# Patient Record
Sex: FEMALE | Race: WHITE | Employment: FULL TIME | URBAN - METROPOLITAN AREA
[De-identification: names, ages, dates, MRNs, and addresses within clinical notes are randomized per-mention and may not be internally consistent; named-entity substitution may affect disease eponyms.]

---

## 2018-12-16 ENCOUNTER — HOSPITAL ENCOUNTER (EMERGENCY)
Age: 38
Discharge: HOME | End: 2018-12-16
Attending: EMERGENCY MEDICINE
Payer: COMMERCIAL

## 2018-12-16 VITALS
DIASTOLIC BLOOD PRESSURE: 69 MMHG | TEMPERATURE: 98.6 F | HEIGHT: 67 IN | OXYGEN SATURATION: 96 % | SYSTOLIC BLOOD PRESSURE: 113 MMHG | BODY MASS INDEX: 23.54 KG/M2 | HEART RATE: 97 BPM | RESPIRATION RATE: 14 BRPM | WEIGHT: 150 LBS

## 2018-12-16 DIAGNOSIS — M25.561 ACUTE PAIN OF RIGHT KNEE: Primary | ICD-10-CM

## 2018-12-16 PROCEDURE — 29505 APPLICATION LONG LEG SPLINT: CPT

## 2018-12-16 PROCEDURE — 99283 EMERGENCY DEPT VISIT LOW MDM: CPT

## 2018-12-16 PROCEDURE — 6370000000 HC RX 637 (ALT 250 FOR IP): Performed by: EMERGENCY MEDICINE

## 2018-12-16 RX ORDER — LEVONORGESTREL AND ETHINYL ESTRADIOL 0.1-0.02MG
1 KIT ORAL
COMMUNITY
Start: 2017-03-28 | End: 2021-03-25 | Stop reason: SDUPTHER

## 2018-12-16 RX ORDER — HYDROCODONE BITARTRATE AND ACETAMINOPHEN 5; 325 MG/1; MG/1
2 TABLET ORAL ONCE
Status: COMPLETED | OUTPATIENT
Start: 2018-12-16 | End: 2018-12-16

## 2018-12-16 RX ORDER — IBUPROFEN 600 MG/1
1 TABLET ORAL
COMMUNITY
Start: 2017-02-13

## 2018-12-16 RX ORDER — LEVOTHYROXINE SODIUM 0.07 MG/1
1 TABLET ORAL
COMMUNITY
Start: 2017-03-24

## 2018-12-16 RX ORDER — HYDROCODONE BITARTRATE AND ACETAMINOPHEN 5; 325 MG/1; MG/1
1 TABLET ORAL EVERY 6 HOURS PRN
Qty: 10 TABLET | Refills: 0 | Status: SHIPPED | OUTPATIENT
Start: 2018-12-16 | End: 2018-12-19

## 2018-12-16 RX ADMIN — HYDROCODONE BITARTRATE AND ACETAMINOPHEN 2 TABLET: 5; 325 TABLET ORAL at 10:37

## 2018-12-16 ASSESSMENT — PAIN DESCRIPTION - PAIN TYPE: TYPE: ACUTE PAIN

## 2018-12-16 ASSESSMENT — PAIN SCALES - GENERAL: PAINLEVEL_OUTOF10: 7

## 2018-12-16 ASSESSMENT — PAIN DESCRIPTION - ORIENTATION: ORIENTATION: RIGHT

## 2018-12-16 ASSESSMENT — PAIN DESCRIPTION - DESCRIPTORS: DESCRIPTORS: ACHING;THROBBING

## 2018-12-16 ASSESSMENT — PAIN DESCRIPTION - FREQUENCY: FREQUENCY: CONTINUOUS

## 2018-12-16 ASSESSMENT — PAIN DESCRIPTION - LOCATION: LOCATION: KNEE

## 2018-12-16 NOTE — ED PROVIDER NOTES
[facility]  [address] [address]  Phone: [phone]  Fax: [phone]    Pt Name: Corona Mackey  MRN: [id number]  Nicolegfurt 1980  Date of evaluation: 12/16/2018      CHIEF COMPLAINT       Chief Complaint   Patient presents with    Knee Pain     right inside          HISTORY OF PRESENT ILLNESS     Corona Mackey is a 45 y.o. female who presents With pain in the right knee over the past month which is worse over the past 2 days. Patient is a 7 out of a 10. She has swelling throughout the knee. No blunt trauma. No prior history of knee problems. She did see an orthopedic physician for this and x-rays were negative last week. She is due to have a MRI and follow up with the orthopedic physician again this week. She has no thigh or lower leg pain. No distal numbness. No fever or constitutional symptoms. Brought in by her relative. She took Motrin without relief of her pain. Her pain is a 7 out of a 10. REVIEW OF SYSTEMS       Other ROS negative except as noted above. PAST MEDICAL HISTORY    has a past medical history of Diabetes mellitus ([address] Rheumatoid arthritis ([facility] [address] and Thyroid disease. SURGICAL HISTORY      has no past surgical history on file. CURRENT MEDICATIONS       Previous Medications    GLUCAGON (GLUCAGON EMERGENCY) 1 MG INJECTION    as needed. IBUPROFEN (ADVIL;MOTRIN) 600 MG TABLET    Take 1 tablet by mouth    INSULIN LISPRO (HUMALOG) 100 UNIT/ML INJECTION VIAL    Insulin Pump    LEVONORGESTREL-ETHINYL ESTRADIOL (FALMINA) 0.1-20 MG-MCG PER TABLET    Take 1 tablet by mouth    LEVOTHYROXINE (SYNTHROID) 75 MCG TABLET    Take 1 tablet by mouth    METHOTREXATE 2.5 MG/ML SOLN    Take 10 tablets by mouth       ALLERGIES     has No Known Allergies. FAMILY HISTORY     has no family status information on file. family history is not on file. SOCIAL HISTORY      reports that she has never smoked.  She has never used smokeless tobacco. She reports that she does not drink alcohol or use drugs. PHYSICAL EXAM     INITIAL VITALS:  height is 5' 7\" (1.702 m) and weight is 68 kg (150 lb). Her oral temperature is 98.6 °F (37 °C). Her blood pressure is 113/69 and her pulse is 97. Her respiration is 14 and oxygen saturation is 96%. Constitutional: The patient is alert, well-developed, in no acute distress. Vital signs as noted. Right knee demonstrates some swelling throughout the knee joint with positive ballottement. Range of motion due to pain. No redness. No evidence of infection. There is no thigh or lower leg pain to palpation. Neurovascular compromise. DIFFERENTIAL DIAGNOSIS/ MEDICAL DECISION MAKING:      Knee immobilizer crutches and Norco to use as directed    Continue Motrin as directed     Follow Exit Care instructions closely. I have reviewed the disposition diagnosis with the patient and or their family/guardian. I have answered their questions and given discharge instructions. They voiced understanding of these instructions and did not have any further questions or complaints. DIAGNOSTIC RESULTS     RADIOLOGY:   Non-plain film images such as CT, Ultrasound and MRI are read by the radiologist. Plain radiographic images are visualized and preliminarily interpreted by the emergency physician with the below findings:  No orders to display         LABS:  No results found for this visit on 12/16/18. ABNORMAL LABS:  Labs Reviewed - No data to display           EMERGENCY DEPARTMENT COURSE:   Vitals:    Vitals:    12/16/18 1001   BP: 113/69   Pulse: 97   Resp: 14   Temp: 98.6 °F (37 °C)   TempSrc: Oral   SpO2: 96%   Weight: 68 kg (150 lb)   Height: 5' 7\" (1.702 m)     -------------------------  BP: 113/69, Temp: 98.6 °F (37 °C), Pulse: 97, Resp: 14    See DDX/MDM  (Differential Diagnosis/Medical Decision Making) above. FINAL IMPRESSION      1.  Acute pain of right knee          DISPOSITION/PLAN

## 2018-12-17 ENCOUNTER — HOSPITAL ENCOUNTER (OUTPATIENT)
Dept: MRI IMAGING | Facility: CLINIC | Age: 38
Discharge: HOME | End: 2018-12-19
Payer: COMMERCIAL

## 2018-12-17 DIAGNOSIS — R52 PAIN: ICD-10-CM

## 2018-12-17 PROCEDURE — 73721 MRI JNT OF LWR EXTRE W/O DYE: CPT

## 2019-10-25 ENCOUNTER — HOSPITAL ENCOUNTER (OUTPATIENT)
Age: 39
Setting detail: SPECIMEN
Discharge: HOME | End: 2019-10-25
Payer: COMMERCIAL

## 2019-10-25 ENCOUNTER — OFFICE VISIT (OUTPATIENT)
Dept: OBGYN CLINIC | Age: 39
End: 2019-10-25
Payer: COMMERCIAL

## 2019-10-25 VITALS
SYSTOLIC BLOOD PRESSURE: 131 MMHG | HEIGHT: 67 IN | HEART RATE: 80 BPM | DIASTOLIC BLOOD PRESSURE: 79 MMHG | WEIGHT: 159.8 LBS | BODY MASS INDEX: 25.08 KG/M2

## 2019-10-25 DIAGNOSIS — R35.0 URINARY FREQUENCY: ICD-10-CM

## 2019-10-25 DIAGNOSIS — N76.0 ACUTE VAGINITIS: Primary | ICD-10-CM

## 2019-10-25 DIAGNOSIS — N89.8 VAGINAL DISCHARGE: ICD-10-CM

## 2019-10-25 DIAGNOSIS — Z86.19 HISTORY OF HERPES GENITALIS: ICD-10-CM

## 2019-10-25 LAB
DIRECT EXAM: ABNORMAL
Lab: ABNORMAL
SPECIMEN DESCRIPTION: ABNORMAL

## 2019-10-25 PROCEDURE — 99202 OFFICE O/P NEW SF 15 MIN: CPT | Performed by: ADVANCED PRACTICE MIDWIFE

## 2019-10-25 RX ORDER — FLUCONAZOLE 150 MG/1
150 TABLET ORAL ONCE
Qty: 1 TABLET | Refills: 0 | Status: SHIPPED | OUTPATIENT
Start: 2019-10-25 | End: 2019-10-25

## 2019-10-25 RX ORDER — VALACYCLOVIR HYDROCHLORIDE 500 MG/1
500 TABLET, FILM COATED ORAL DAILY
Qty: 30 TABLET | Refills: 3 | Status: SHIPPED | OUTPATIENT
Start: 2019-10-25 | End: 2020-12-08 | Stop reason: SDUPTHER

## 2019-10-25 ASSESSMENT — ENCOUNTER SYMPTOMS
ABDOMINAL PAIN: 0
NAUSEA: 0
DIARRHEA: 0
SHORTNESS OF BREATH: 0
VOMITING: 0

## 2019-10-26 LAB
CULTURE: NO GROWTH
Lab: NORMAL
SPECIMEN DESCRIPTION: NORMAL

## 2019-10-29 DIAGNOSIS — B96.89 BV (BACTERIAL VAGINOSIS): Primary | ICD-10-CM

## 2019-10-29 DIAGNOSIS — N76.0 BV (BACTERIAL VAGINOSIS): Primary | ICD-10-CM

## 2019-10-29 RX ORDER — METRONIDAZOLE 500 MG/1
500 TABLET ORAL 2 TIMES DAILY
Qty: 14 TABLET | Refills: 0 | Status: SHIPPED | OUTPATIENT
Start: 2019-10-29 | End: 2019-11-05

## 2020-03-25 PROBLEM — Z86.19 HISTORY OF HERPES GENITALIS: Status: RESOLVED | Noted: 2019-10-25 | Resolved: 2020-03-24

## 2020-12-08 ENCOUNTER — HOSPITAL ENCOUNTER (OUTPATIENT)
Age: 40
Setting detail: SPECIMEN
Discharge: HOME | End: 2020-12-08
Payer: COMMERCIAL

## 2020-12-08 ENCOUNTER — OFFICE VISIT (OUTPATIENT)
Dept: OBGYN CLINIC | Age: 40
End: 2020-12-08
Payer: COMMERCIAL

## 2020-12-08 VITALS
HEART RATE: 78 BPM | BODY MASS INDEX: 24.25 KG/M2 | HEIGHT: 68 IN | WEIGHT: 160 LBS | SYSTOLIC BLOOD PRESSURE: 135 MMHG | DIASTOLIC BLOOD PRESSURE: 86 MMHG

## 2020-12-08 PROCEDURE — 99213 OFFICE O/P EST LOW 20 MIN: CPT | Performed by: ADVANCED PRACTICE MIDWIFE

## 2020-12-08 RX ORDER — VALACYCLOVIR HYDROCHLORIDE 500 MG/1
500 TABLET, FILM COATED ORAL 2 TIMES DAILY PRN
Qty: 30 TABLET | Refills: 3 | Status: SHIPPED | OUTPATIENT
Start: 2020-12-08 | End: 2021-06-01

## 2020-12-08 ASSESSMENT — ENCOUNTER SYMPTOMS
VOMITING: 0
SHORTNESS OF BREATH: 0
ABDOMINAL PAIN: 0
NAUSEA: 0
DIARRHEA: 0

## 2020-12-08 ASSESSMENT — PATIENT HEALTH QUESTIONNAIRE - PHQ9
SUM OF ALL RESPONSES TO PHQ QUESTIONS 1-9: 0
2. FEELING DOWN, DEPRESSED OR HOPELESS: 0
1. LITTLE INTEREST OR PLEASURE IN DOING THINGS: 0
SUM OF ALL RESPONSES TO PHQ QUESTIONS 1-9: 0
SUM OF ALL RESPONSES TO PHQ QUESTIONS 1-9: 0
SUM OF ALL RESPONSES TO PHQ9 QUESTIONS 1 & 2: 0

## 2020-12-08 NOTE — PROGRESS NOTES
Gastrointestinal: Negative for abdominal pain, diarrhea, nausea and vomiting. Genitourinary: Positive for dysuria and vaginal discharge (itching). Negative for difficulty urinating, dyspareunia, menstrual problem and vaginal pain. Neurological: Negative for dizziness, light-headedness and headaches. Objective  /86 (Site: Right Upper Arm, Position: Sitting, Cuff Size: Medium Adult)   Pulse 78   Ht 5' 8.4\" (1.737 m)   Wt 160 lb (72.6 kg)   BMI 24.04 kg/m²     Physical Exam  Vitals signs reviewed. Constitutional:       General: She is not in acute distress. Appearance: She is well-developed. She is not diaphoretic. Neck:      Musculoskeletal: Normal range of motion. Thyroid: No thyromegaly. Cardiovascular:      Rate and Rhythm: Normal rate and regular rhythm. Pulmonary:      Effort: Pulmonary effort is normal. No respiratory distress. Breath sounds: Normal breath sounds. Genitourinary:     Comments: Vulvar redness noted  Musculoskeletal: Normal range of motion. Skin:     General: Skin is warm and dry. Neurological:      Mental Status: She is alert and oriented to person, place, and time. Psychiatric:         Behavior: Behavior normal.         Thought Content: Thought content normal.         Judgment: Judgment normal.           Assessment & Plan  1. Vaginal itching  - To follow up with results   - VAGINITIS DNA PROBE  - Reviewed medicated OTC monistat wipes and vitamin e oil on the tissue. 2. Dysuria  - To follow up with urine culture. - Discussed increasing fluids and AZO over the counter  - Culture, Urine; Future    3. History of herpes genitalis  - valACYclovir (VALTREX) 500 MG tablet; Take 1 tablet by mouth 2 times daily as needed (during HSV outbreaks)  Dispense: 30 tablet; Refill: 3        Patient was seen with total face to face time of 15 minutes. Morethan 50% of this visit was on counseling and education regarding her diagnoses and her options.  She was also counseled on her preventative health maintenance recommendations and follow-up. Return for annual due for pap.     Electronically Signed ARNULFO Pennington CNM

## 2020-12-09 LAB
DIRECT EXAM: NORMAL
Lab: NORMAL
SPECIMEN DESCRIPTION: NORMAL

## 2020-12-10 LAB
CULTURE: NO GROWTH
Lab: NORMAL
SPECIMEN DESCRIPTION: NORMAL

## 2021-03-25 ENCOUNTER — HOSPITAL ENCOUNTER (OUTPATIENT)
Age: 41
Setting detail: SPECIMEN
Discharge: HOME | End: 2021-03-25
Payer: COMMERCIAL

## 2021-03-25 ENCOUNTER — OFFICE VISIT (OUTPATIENT)
Dept: OBGYN CLINIC | Age: 41
End: 2021-03-25
Payer: COMMERCIAL

## 2021-03-25 VITALS
BODY MASS INDEX: 25.76 KG/M2 | SYSTOLIC BLOOD PRESSURE: 132 MMHG | WEIGHT: 170 LBS | HEART RATE: 90 BPM | HEIGHT: 68 IN | DIASTOLIC BLOOD PRESSURE: 84 MMHG

## 2021-03-25 DIAGNOSIS — Z12.31 SCREENING MAMMOGRAM, ENCOUNTER FOR: ICD-10-CM

## 2021-03-25 DIAGNOSIS — N93.0 POSTCOITAL BLEEDING: ICD-10-CM

## 2021-03-25 DIAGNOSIS — N94.6 DYSMENORRHEA: ICD-10-CM

## 2021-03-25 DIAGNOSIS — Z01.419 WELL WOMAN EXAM: Primary | ICD-10-CM

## 2021-03-25 PROCEDURE — 99396 PREV VISIT EST AGE 40-64: CPT | Performed by: ADVANCED PRACTICE MIDWIFE

## 2021-03-25 RX ORDER — LEVONORGESTREL AND ETHINYL ESTRADIOL 0.1-0.02MG
1 KIT ORAL DAILY
Qty: 1 PACKET | Refills: 12 | Status: SHIPPED | OUTPATIENT
Start: 2021-03-25 | End: 2022-06-23

## 2021-03-25 SDOH — SOCIAL STABILITY: SOCIAL NETWORK
DO YOU BELONG TO ANY CLUBS OR ORGANIZATIONS SUCH AS CHURCH GROUPS UNIONS, FRATERNAL OR ATHLETIC GROUPS, OR SCHOOL GROUPS?: NOT ASKED

## 2021-03-25 SDOH — SOCIAL STABILITY: SOCIAL INSECURITY: WITHIN THE LAST YEAR, HAVE YOU BEEN HUMILIATED OR EMOTIONALLY ABUSED IN OTHER WAYS BY YOUR PARTNER OR EX-PARTNER?: NO

## 2021-03-25 SDOH — SOCIAL STABILITY: SOCIAL NETWORK: HOW OFTEN DO YOU GET TOGETHER WITH FRIENDS OR RELATIVES?: NOT ASKED

## 2021-03-25 SDOH — SOCIAL STABILITY: SOCIAL NETWORK: HOW OFTEN DO YOU ATTENT MEETINGS OF THE CLUB OR ORGANIZATION YOU BELONG TO?: NOT ASKED

## 2021-03-25 SDOH — ECONOMIC STABILITY: FOOD INSECURITY: WITHIN THE PAST 12 MONTHS, THE FOOD YOU BOUGHT JUST DIDN'T LAST AND YOU DIDN'T HAVE MONEY TO GET MORE.: NEVER TRUE

## 2021-03-25 SDOH — ECONOMIC STABILITY: TRANSPORTATION INSECURITY
IN THE PAST 12 MONTHS, HAS THE LACK OF TRANSPORTATION KEPT YOU FROM MEDICAL APPOINTMENTS OR FROM GETTING MEDICATIONS?: NO

## 2021-03-25 SDOH — SOCIAL STABILITY: SOCIAL INSECURITY: WITHIN THE LAST YEAR, HAVE YOU BEEN AFRAID OF YOUR PARTNER OR EX-PARTNER?: NO

## 2021-03-25 ASSESSMENT — ENCOUNTER SYMPTOMS
DIARRHEA: 0
NAUSEA: 0
SHORTNESS OF BREATH: 0
VOMITING: 0
ABDOMINAL PAIN: 0

## 2021-03-25 ASSESSMENT — PATIENT HEALTH QUESTIONNAIRE - PHQ9
SUM OF ALL RESPONSES TO PHQ QUESTIONS 1-9: 0

## 2021-03-25 ASSESSMENT — ANXIETY QUESTIONNAIRES
6. BECOMING EASILY ANNOYED OR IRRITABLE: 0-NOT AT ALL
7. FEELING AFRAID AS IF SOMETHING AWFUL MIGHT HAPPEN: 0-NOT AT ALL
GAD7 TOTAL SCORE: 0
5. BEING SO RESTLESS THAT IT IS HARD TO SIT STILL: 0-NOT AT ALL

## 2021-03-25 NOTE — PROGRESS NOTES
[place] [address] [address] [facility] [address]  Dept: [phone]    Patient Name: Chris Mendoza  Patient Age: 36 y.o. Date of Visit: 3/25/2021    Subjective  Chief Complaint   Patient presents with   [facility] Gynecologic Exam     prev. pap 11-2-17 satis/neg bleeding during sex      No LMP recorded. (Menstrual status: Other - See Notes). Chaperone for Intimate Exam   Chaperone was offered as part of the rooming process. Patient declined and agrees to continue with exam without a chaperone.  Chaperone: n/a    HPI  Patient arrives for annual exam.  Patient has concerns today. Concerns include postcoital bleeding that she has noticed for the past month. Reports it is painless and spontaneously resolves  Patient reports is  sexually active with 1 male partner(s). Patient denies  pain with sex. Patient admits to a history of sexually transmitted infection(s). Patient does want screening for sexually transmitted infection(s). Reports is  on contraception oral contraception due to dysmenorrhea. Patient has a history of a tubal ligation    PHQ Scores 3/25/2021 12/8/2020   PHQ2 Score 0 0   PHQ9 Score 0 0     Interpretation of Total Score Depression Severity: 1-4 = Minimal depression, 5-9 = Mild depression, 10-14 = Moderate depression, 15-19 = Moderately severe depression, 20-27 = Severe depression  IZZY 7 SCORE 3/25/2021   IZZY-7 Total Score 0     Interpretation of IZZY-7 score: 5-9 = mild anxiety, 10-14 = moderate anxiety, 15+ = severe anxiety. Recommend referral to behavioral health for scores 10 or greater. Relationships   Intimate partner violence    Fear of current or ex partner: No    Emotionally abused: No    Physically abused: No    Forced sexual activity: No       Review of Systems   Constitutional: Negative for unexpected weight change. Respiratory: Negative for shortness of breath.     Cardiovascular: Negative for chest pain, palpitations and leg swelling. Gastrointestinal: Negative for abdominal pain, diarrhea, nausea and vomiting. Genitourinary: Positive for vaginal bleeding. Negative for difficulty urinating, dyspareunia, menstrual problem, vaginal discharge and vaginal pain. Neurological: Negative for dizziness, light-headedness and headaches. Preventive Health Screening:   Date of last pap: ? No results found for: CYTORPT               History of Abnormal Paps: yes - hx of     History of Gestational Diabetes: No     If Yes, Glucose screening ordered:No    Preventive screening: Yes    Family history of Breast, Ovarian, Colon or Uterine Cancer:  Half sister has ovarian cancer     If Yes see scanned worksheet    Objective  /84 (Site: Right Upper Arm, Position: Sitting, Cuff Size: Medium Adult)   Pulse 90   Ht 5' 8.4\" (1.737 m)   Wt 170 lb (77.1 kg)   BMI 25.55 kg/m²       Gynecologic History  Menarche: 12  Monthly menses (days): regular   Length: 5  Flow: moderate    Menopause: no    Sexually active: Yes  New Sex Partner within 3 months: No  Domestic Violence Screening: negative    STD history: Yes     Birth control method :Yes birth control pill and history of tubal ligation      Physical Exam  Constitutional:       Appearance: Normal appearance. Genitourinary:      No vulval tenderness or Bartholin's cyst noted. No signs of labial injury. Cervical polyp present. No cervical motion tenderness or friability. Genitourinary Comments: Cervical polpy noted, very friable. Grasped with ring forceps which were then twisted with gentle traction polyp removed. Bleeding noted from cervix pressure placed with swab and silver nitrate used, hemostasis achieved. Patient tolerated well   Neck:      Musculoskeletal: Normal range of motion. Cardiovascular:      Rate and Rhythm: Normal rate and regular rhythm. Pulmonary:      Effort: Pulmonary effort is normal.      Breath sounds: Normal breath sounds.    Neurological: Mental Status: She is alert and oriented to person, place, and time. Psychiatric:         Mood and Affect: Mood normal.         Behavior: Behavior normal.         Thought Content: Thought content normal.         Judgment: Judgment normal.   Vitals signs reviewed. Assessment & Plan  1. Well woman exam  - PAP Smear; Future    Age 25 and > Well Woman Care  General Health:  [x] Alcohol screening & counseling  [x] Blood pressure screening: normal  [x] Contraceptive counseling & methods: hx of tubal ligation and using birth control pills  [x] Depression Screening: Negative  [] Diabetes Screening: known type 1 diabetic      [x] Folic acid supplementation  [x] Healthful diet & activity counseling:  discussed  [x] Interpersonal violence screening: Negative  [x] Lipid screening: reports within in the year but not available in epic for review  [x] Obesity Screening: Body mass index is 25.55 kg/m². [] Osteoporosis screening  [x] Substance use screening & counseling  [x] Tobacco screening & counseling  [x] Urinary incontinence screening    Infectious Diseases:  [x] Gonorrhea & chlamydia screening: discussed, obtained at visit  [x] Hepatitis C Screening ordered 3/25/2021   [x] HIV risk assessment/ testing (at least once in lifetime): discussed, ordered. [] Immunizations:   [x] STI prevention counseling    Cancer:  [x] Cervical cancer screening: discussed to follow up per asccp guidelines  [x] Mammograms (started at 39yrs old): discussed ordered and scheduled at visit  [x] BRCA testing risk assessment: discussed, declined    2. Screening mammogram, encounter for  - ELIZABETH DIGITAL SCREEN W OR WO CAD BILATERAL; Future    3. Dysmenorrhea  - Patient was educated to notify office and seek medical care if abdominal pain, chest pain, severe headaches, eye problems/loss of vision, or severe leg pain or swelling in the calf occurs (ACHES).   - Patient is appropriate to continue OCP care  - levonorgestrel-ethinyl estradiol ([address]) 0.1-20 MG-MCG per tablet; Take 1 tablet by mouth daily  Dispense: 1 packet; Refill: 12    4. Postcoital bleeding  - Hemostasis achieved after removal. To follow up with pathology. Reviewed bleeding precautions.   - T. pallidum Ab; Future  - HIV Screen; Future  - Hepatitis C Antibody; Future  - C.trachomatis N.gonorrhoeae DNA, Thin Prep; Future  - Vaginitis DNA Probe; Future  - Surgical Pathology      Return if symptoms worsen or fail to improve. The patient, Gina Ghotra , was seen with a total time spent of 25 minutes for the visit on this date of service by the [facility]  The time component, involved both face-to-face (counseling and education)  and non face-to-face time (care coordination), spent in determining the total time component. She was also counseled on her preventative health maintenance recommendations and follow-up.     Electronically Signed by Dioni Antonio

## 2021-03-26 ENCOUNTER — TELEPHONE (OUTPATIENT)
Dept: OBGYN CLINIC | Age: 41
End: 2021-03-26

## 2021-03-26 DIAGNOSIS — N93.0 POSTCOITAL BLEEDING: ICD-10-CM

## 2021-03-26 DIAGNOSIS — B96.89 BV (BACTERIAL VAGINOSIS): Primary | ICD-10-CM

## 2021-03-26 DIAGNOSIS — N76.0 BV (BACTERIAL VAGINOSIS): Primary | ICD-10-CM

## 2021-03-26 LAB
CHLAMYDIA BY THIN PREP: NEGATIVE
DIRECT EXAM: ABNORMAL
Lab: ABNORMAL
N. GONORRHOEAE DNA, THIN PREP: NEGATIVE
SPECIMEN DESCRIPTION: ABNORMAL
SPECIMEN DESCRIPTION: NORMAL

## 2021-03-26 RX ORDER — METRONIDAZOLE 500 MG/1
500 TABLET ORAL 2 TIMES DAILY
Qty: 14 TABLET | Refills: 0 | Status: SHIPPED | OUTPATIENT
Start: 2021-03-26 | End: 2021-04-02

## 2021-03-27 LAB
HPV SAMPLE: NORMAL
HPV, GENOTYPE 16: NOT DETECTED
HPV, GENOTYPE 18: NOT DETECTED
HPV, HIGH RISK OTHER: NOT DETECTED
HPV, INTERPRETATION: NORMAL
SPECIMEN DESCRIPTION: NORMAL

## 2021-03-29 LAB — SURGICAL PATHOLOGY REPORT: NORMAL

## 2021-03-30 ENCOUNTER — TELEPHONE (OUTPATIENT)
Dept: OBGYN CLINIC | Age: 41
End: 2021-03-30

## 2021-04-01 LAB — CYTOLOGY REPORT: NORMAL

## 2021-04-05 ENCOUNTER — TELEPHONE (OUTPATIENT)
Dept: OBGYN CLINIC | Age: 41
End: 2021-04-05

## 2021-04-15 ENCOUNTER — HOSPITAL ENCOUNTER (OUTPATIENT)
Age: 41
Setting detail: SPECIMEN
Discharge: HOME | End: 2021-04-15
Payer: COMMERCIAL

## 2021-04-15 ENCOUNTER — OFFICE VISIT (OUTPATIENT)
Dept: OBGYN CLINIC | Age: 41
End: 2021-04-15
Payer: COMMERCIAL

## 2021-04-15 VITALS
BODY MASS INDEX: 24.34 KG/M2 | DIASTOLIC BLOOD PRESSURE: 83 MMHG | HEART RATE: 80 BPM | HEIGHT: 70 IN | WEIGHT: 170 LBS | SYSTOLIC BLOOD PRESSURE: 133 MMHG | TEMPERATURE: 80 F

## 2021-04-15 DIAGNOSIS — R87.615 PAP SMEAR OF CERVIX UNSATISFACTORY: Primary | ICD-10-CM

## 2021-04-15 PROCEDURE — 99211 OFF/OP EST MAY X REQ PHY/QHP: CPT | Performed by: ADVANCED PRACTICE MIDWIFE

## 2021-04-20 LAB
CYTOLOGY REPORT: NORMAL
HPV SAMPLE: ABNORMAL
HPV, GENOTYPE 16: NOT DETECTED
HPV, GENOTYPE 18: NOT DETECTED
HPV, HIGH RISK OTHER: DETECTED
HPV, INTERPRETATION: ABNORMAL
SPECIMEN DESCRIPTION: ABNORMAL

## 2021-05-11 ENCOUNTER — TELEPHONE (OUTPATIENT)
Dept: OBGYN CLINIC | Age: 41
End: 2021-05-11

## 2021-05-18 ENCOUNTER — TELEPHONE (OUTPATIENT)
Dept: OBGYN CLINIC | Age: 41
End: 2021-05-18

## 2021-05-25 ENCOUNTER — TELEPHONE (OUTPATIENT)
Dept: OBGYN CLINIC | Age: 41
End: 2021-05-25

## 2021-05-25 PROBLEM — B97.7 HPV IN FEMALE: Status: ACTIVE | Noted: 2021-05-25

## 2021-05-25 NOTE — TELEPHONE ENCOUNTER
Patient aware of normal pap and that HPV was present so they recommended yearly Pap. Patient  understood.

## 2021-06-01 DIAGNOSIS — Z86.19 HISTORY OF HERPES GENITALIS: ICD-10-CM

## 2021-06-01 RX ORDER — VALACYCLOVIR HYDROCHLORIDE 500 MG/1
TABLET, FILM COATED ORAL
Qty: 30 TABLET | Refills: 3 | Status: SHIPPED | OUTPATIENT
Start: 2021-06-01 | End: 2021-09-28 | Stop reason: SDUPTHER

## 2021-09-02 ENCOUNTER — TELEPHONE (OUTPATIENT)
Dept: OBGYN CLINIC | Age: 41
End: 2021-09-02

## 2021-09-28 DIAGNOSIS — Z86.19 HISTORY OF HERPES GENITALIS: ICD-10-CM

## 2021-09-29 RX ORDER — VALACYCLOVIR HYDROCHLORIDE 500 MG/1
TABLET, FILM COATED ORAL
Qty: 30 TABLET | Refills: 3 | Status: SHIPPED | OUTPATIENT
Start: 2021-09-29 | End: 2022-04-19

## 2021-10-26 ENCOUNTER — HOSPITAL ENCOUNTER (OUTPATIENT)
Dept: ULTRASOUND IMAGING | Facility: CLINIC | Age: 41
Discharge: HOME | End: 2021-10-28
Payer: COMMERCIAL

## 2021-10-26 DIAGNOSIS — E04.2 NONTOXIC MULTINODULAR GOITER: ICD-10-CM

## 2021-10-26 DIAGNOSIS — R13.10 PROBLEMS WITH SWALLOWING AND MASTICATION: ICD-10-CM

## 2021-10-26 PROCEDURE — 76536 US EXAM OF HEAD AND NECK: CPT

## 2022-02-21 ENCOUNTER — TELEPHONE (OUTPATIENT)
Dept: OBGYN CLINIC | Age: 42
End: 2022-02-21

## 2022-04-14 DIAGNOSIS — Z86.19 HISTORY OF HERPES GENITALIS: ICD-10-CM

## 2022-04-14 DIAGNOSIS — N94.6 DYSMENORRHEA: ICD-10-CM

## 2022-04-14 RX ORDER — LEVONORGESTREL AND ETHINYL ESTRADIOL 0.1-0.02MG
KIT ORAL
Qty: 28 TABLET | OUTPATIENT
Start: 2022-04-14

## 2022-04-14 NOTE — TELEPHONE ENCOUNTER
Max Rivera is requesting a refill on valtrex.      Last Annual visit:  04/15/21  Next Office visit:  None on file    Currently Pregnant no  Last OB visit: n/a  Next OB visit: n/a    Last prescribing provider:  Renny quinn

## 2022-04-14 NOTE — TELEPHONE ENCOUNTER
Isaak Gutierrez is requesting a refill on lessina.  (not in patients chart)     Last Annual visit:  04/15/21  Next Office visit:  None on file    Currently Pregnant no  Last OB visit: n/a  Next OB visit: n/a    Last prescribing provider:  Bari quinn

## 2022-04-19 DIAGNOSIS — N94.6 DYSMENORRHEA: ICD-10-CM

## 2022-04-19 RX ORDER — LEVONORGESTREL AND ETHINYL ESTRADIOL 0.1-0.02MG
KIT ORAL
Qty: 28 TABLET | OUTPATIENT
Start: 2022-04-19

## 2022-04-19 RX ORDER — VALACYCLOVIR HYDROCHLORIDE 500 MG/1
TABLET, FILM COATED ORAL
Qty: 30 TABLET | Refills: 3 | Status: SHIPPED | OUTPATIENT
Start: 2022-04-19 | End: 2022-09-28

## 2022-04-19 NOTE — TELEPHONE ENCOUNTER
Kiet Johnson is requesting a refill on LESSINA-28 TABLET. Last Annual visit:  04/15/21  Next Office visit:  None on file (lmom to call and schedule)    Currently Pregnant no   Last OB visit: n/a  Next OB visit: n/a    Last prescribing provider:  Tarsha quinn     I was unable to find the Zarina on her med list..

## 2022-06-13 NOTE — PROGRESS NOTES
[address] [facility]  [address] [address]  Dept: [phone]    Patient Name: Max Rivera  Patient Age: 43 y.o. Date of Visit: 2022    Subjective  Chief Complaint   Patient presents with    Pelvic Pain     Right sided pelvic pain, history of ovarian cyst     No LMP recorded. (Menstrual status: Irregular periods). HPI    Max Rivera is a  who presents with concerns of right lower quadrant pain and irregular bleeding. Has a history of ovarian cyst, pain feels similar. Pain is in the RLQ, has been present on and off for about 3 months. Pain started in March. She describes the pain as cramping. The pain is a 5/10. It does not radiate anywhere. Nothing seems to make the pain better or worse. Has  tried  OTC medications, Motrin and Tylenol. Reports minimal/no improvement. Rosario does not have regular menstrual cycles since she uses continuous OCPs. Usually does not have breakthrough bleeding. Current bleeding, it started Thursay, flow varies by day. Had breakthrough bleeding 2 weeks ago for 2-3 days. Bright red blood. Reports is sexually active. Admits pain with sexual activity, not related to length or position only happens occasionally. Pain as not increased or improved over the last 3 months. Reports is active with 1 sex partner (male). Reports sex is consensual.   Reports is preventing a pregnancy by OTCs. Patient is happy with method. Patient does not want screening for STDs.        OB History    Para Term  AB Living   2 0 0 0 0 2   SAB IAB Ectopic Molar Multiple Live Births   0 0 0 0 0 0      # Outcome Date GA Lbr Mirza/2nd Weight Sex Delivery Anes PTL Lv   2       CS-LTranv      1       CS-LTranv        Past Medical History:   Diagnosis Date    Diabetes mellitus ([facility] [address]     Rheumatoid arthritis ([facility] [address]     Thyroid disease Past Surgical History:   Procedure Laterality Date    ANKLE SURGERY Left     CARPAL TUNNEL RELEASE Left      SECTION      x2    FINGER SURGERY Left     KNEE SURGERY      x2    SHOULDER SURGERY Left      Family History   Problem Relation Age of Onset    No Known Problems Father      Social History     Socioeconomic History    Marital status: Single     Spouse name: Not on file    Number of children: Not on file    Years of education: Not on file    Highest education level: Not on file   Occupational History    Not on file   Tobacco Use    Smoking status: Never Smoker    Smokeless tobacco: Never Used   Vaping Use    Vaping Use: Never used   Substance and Sexual Activity    Alcohol use: No    Drug use: No    Sexual activity: Yes     Partners: Male   Other Topics Concern    Not on file   Social History Narrative    Not on file     Social Determinants of Health     Financial Resource Strain: Low Risk     Difficulty of Paying Living Expenses: Not hard at all   Food Insecurity: No Food Insecurity    Worried About [address] in the Last Year: Never true    [facility] [employer] in the Last Year: Never true   Transportation Needs:     Lack of Transportation (Medical): Not on file    Lack of Transportation (Non-Medical):  Not on file   Physical Activity:     Days of Exercise per Week: Not on file    Minutes of Exercise per Session: Not on file   Stress:     Feeling of Stress : Not on file   Social Connections:     Frequency of Communication with Friends and Family: Not on file    Frequency of Social Gatherings with Friends and Family: Not on file    Attends [employer] Services: Not on file    Active Member of Clubs or Organizations: Not on file    Attends Club or Organization Meetings: Not on file    Marital Status: Not on file   Intimate Partner Violence:     Fear of Current or Ex-Partner: Not on file    Emotionally Abused: Not on file    Physically Abused: Not on file    Sexually Abused: Not on file   Housing Stability:     Unable to Pay for Housing in the Last Year: Not on file    Number of Places Lived in the Last Year: Not on file    Unstable Housing in the Last Year: Not on file         MEDICATIONS:  Current Outpatient Medications   Medication Sig Dispense Refill    VITAMIN D PO Take by mouth      Ascorbic Acid (VITAMIN C PO) Take by mouth      valACYclovir (VALTREX) 500 MG tablet TAKE 1 TABLET BY MOUTH TWICE DAILY AS NEEDED DURING HSV OUTBREAKS 30 tablet 3    levonorgestrel-ethinyl estradiol (FALMINA) 0.1-20 MG-MCG per tablet Take 1 tablet by mouth daily 1 packet 12    Adalimumab (HUMIRA SC) Inject into the skin      levothyroxine (SYNTHROID) 75 MCG tablet Take 1 tablet by mouth      ibuprofen (ADVIL;MOTRIN) 600 MG tablet Take 1 tablet by mouth      insulin lispro (HUMALOG) 100 UNIT/ML injection vial Insulin Pump      glucagon (GLUCAGON EMERGENCY) 1 MG injection as needed. No current facility-administered medications for this visit. ALLERGIES:  Allergies as of 06/14/2022    (No Known Allergies)       Review of Systems:  Constitutional: Negative for chills, fever, fatigue. Respiratory: Negative for shortness of breath and cough. Cardiovascular: Negative for chest pain and palpitations. Gastrointestinal: Negative for diarrhea, nausea, and vomiting. Positive for RLQ pain. Genitourinary: Negative for dysuria, frequency, and urgency. Musculoskeletal: Negative for back pain and joint swelling. Neurological: Negative for dizziness, weakness, and seizures. Psychiatric: Negative for dysphoric mood or sleep disturbances. Not anxious/nervous. Objective  /78   Ht 5' 7\" (1.702 m)   Wt 158 lb 12.8 oz (72 kg)   BMI 24.87 kg/m²     Physical Exam  Constitutional:       Appearance: Normal appearance. She is normal weight. HENT:      Head: Normocephalic and atraumatic.    Cardiovascular:      Rate and Rhythm: Normal rate and regular rhythm. Pulses: Normal pulses. Heart sounds: Normal heart sounds. No murmur heard. Pulmonary:      Effort: Pulmonary effort is normal. No respiratory distress. Breath sounds: Normal breath sounds. No wheezing. Abdominal:      General: Abdomen is flat. Bowel sounds are normal.      Palpations: Abdomen is soft. Tenderness: There is abdominal tenderness (RLQ). There is guarding (RLQ). There is no right CVA tenderness or left CVA tenderness. Genitourinary:     General: Normal vulva. Labia:         Right: No rash, tenderness or lesion. Left: No rash, tenderness or lesion. Vagina: Normal. No tenderness or lesions. Cervix: Cervical bleeding present. No cervical motion tenderness or lesion. Uterus: Normal. Not tender. Adnexa:         Right: Tenderness present. Left: No tenderness. Rectum: Normal. No external hemorrhoid. Musculoskeletal:         General: No swelling. Normal range of motion. Cervical back: Normal range of motion and neck supple. No rigidity or tenderness. Right lower leg: No edema. Left lower leg: No edema. Skin:     General: Skin is warm and dry. Coloration: Skin is not jaundiced. Findings: No bruising or lesion. Neurological:      General: No focal deficit present. Mental Status: She is alert. Mental status is at baseline. Psychiatric:         Mood and Affect: Mood normal.         Behavior: Behavior normal.         Thought Content: Thought content normal.         Judgment: Judgment normal.         Assessment & Plan  1. Pelvic pain  - US PELVIS COMPLETE NON-OB TRANSABDOMINAL AND TRANSVAGINAL; Future  - Continue Motrin/Tylenol as needed for pain relief  - follow up pending ultrasound results    2. Right lower quadrant pain  - US PELVIS COMPLETE NON-OB TRANSABDOMINAL AND TRANSVAGINAL; Future    3.  Breakthrough bleeding on birth control pills  - Discussed having a menstrual cycle and then continuing on continous OCPs      The patient, Wilmer Lu , was seen with a total time spent of 25 minutes for the visit on this date of service by the [facility]  Both face-to-face (counseling and education) and non face-to-face time (care coordination), were spent in determining the total time component. Return if symptoms worsen or fail to improve, for  ultrasound results.     Electronically Signed ARNULFO Gloria CNM

## 2022-06-14 ENCOUNTER — OFFICE VISIT (OUTPATIENT)
Dept: OBGYN CLINIC | Age: 42
End: 2022-06-14
Payer: COMMERCIAL

## 2022-06-14 VITALS
HEIGHT: 67 IN | WEIGHT: 158.8 LBS | SYSTOLIC BLOOD PRESSURE: 124 MMHG | DIASTOLIC BLOOD PRESSURE: 78 MMHG | BODY MASS INDEX: 24.92 KG/M2

## 2022-06-14 DIAGNOSIS — R10.2 PELVIC PAIN: Primary | ICD-10-CM

## 2022-06-14 DIAGNOSIS — N92.1 BREAKTHROUGH BLEEDING ON BIRTH CONTROL PILLS: ICD-10-CM

## 2022-06-14 DIAGNOSIS — R10.31 RIGHT LOWER QUADRANT PAIN: ICD-10-CM

## 2022-06-14 PROCEDURE — G8427 DOCREV CUR MEDS BY ELIG CLIN: HCPCS

## 2022-06-14 PROCEDURE — G8420 CALC BMI NORM PARAMETERS: HCPCS

## 2022-06-14 PROCEDURE — 99212 OFFICE O/P EST SF 10 MIN: CPT

## 2022-06-14 PROCEDURE — 1036F TOBACCO NON-USER: CPT

## 2022-06-14 SDOH — ECONOMIC STABILITY: FOOD INSECURITY: WITHIN THE PAST 12 MONTHS, THE FOOD YOU BOUGHT JUST DIDN'T LAST AND YOU DIDN'T HAVE MONEY TO GET MORE.: NEVER TRUE

## 2022-06-14 SDOH — ECONOMIC STABILITY: FOOD INSECURITY: WITHIN THE PAST 12 MONTHS, YOU WORRIED THAT YOUR FOOD WOULD RUN OUT BEFORE YOU GOT MONEY TO BUY MORE.: NEVER TRUE

## 2022-06-14 ASSESSMENT — PATIENT HEALTH QUESTIONNAIRE - PHQ9
SUM OF ALL RESPONSES TO PHQ QUESTIONS 1-9: 0
SUM OF ALL RESPONSES TO PHQ9 QUESTIONS 1 & 2: 0
SUM OF ALL RESPONSES TO PHQ QUESTIONS 1-9: 0
SUM OF ALL RESPONSES TO PHQ QUESTIONS 1-9: 0
2. FEELING DOWN, DEPRESSED OR HOPELESS: 0
SUM OF ALL RESPONSES TO PHQ QUESTIONS 1-9: 0
1. LITTLE INTEREST OR PLEASURE IN DOING THINGS: 0

## 2022-06-14 ASSESSMENT — SOCIAL DETERMINANTS OF HEALTH (SDOH): HOW HARD IS IT FOR YOU TO PAY FOR THE VERY BASICS LIKE FOOD, HOUSING, MEDICAL CARE, AND HEATING?: NOT HARD AT ALL

## 2022-06-14 NOTE — PROGRESS NOTES
Patient here for right sided pelvic pain that started about 2 months ago and getting worse. History of a right ovarian cyst.  LMP No LMP recorded. (Menstrual status: Irregular periods). Chaperone - offered as part of the rooming process. Patient declined and agrees to continue with exam without a chaperone.

## 2022-06-22 ENCOUNTER — TELEPHONE (OUTPATIENT)
Dept: OBGYN CLINIC | Age: 42
End: 2022-06-22

## 2022-06-22 DIAGNOSIS — N94.6 DYSMENORRHEA: ICD-10-CM

## 2022-06-23 RX ORDER — LEVONORGESTREL AND ETHINYL ESTRADIOL 0.1-0.02MG
KIT ORAL
Qty: 28 TABLET | Refills: 0 | Status: SHIPPED | OUTPATIENT
Start: 2022-06-23

## 2022-06-23 NOTE — TELEPHONE ENCOUNTER
Joshua Oneill is requesting a refill on [address] Annual visit:  4/15/21  Next Office visit:  none    Currently Pregnant no  Last OB visit: n/a  Next OB visit: n/a    Last prescribing provider:  Mayelin Mcmahan    Pt is an [facility].       RX sent- Tomasz Fairchild

## 2022-06-27 RX ORDER — LEVONORGESTREL AND ETHINYL ESTRADIOL 0.1-0.02MG
1 KIT ORAL DAILY
Qty: 1 PACKET | Refills: 11 | Status: SHIPPED | OUTPATIENT
Start: 2022-06-27 | End: 2022-07-18

## 2022-07-14 DIAGNOSIS — N94.6 DYSMENORRHEA: ICD-10-CM

## 2022-07-14 RX ORDER — LEVONORGESTREL AND ETHINYL ESTRADIOL 0.1-0.02MG
KIT ORAL
Qty: 28 TABLET | Refills: 0 | OUTPATIENT
Start: 2022-07-14

## 2022-07-14 NOTE — TELEPHONE ENCOUNTER
lvm for pt clarify why another request came through for bc. 12 refills were sent in to her pharmacy on 6/27/22. I informed pt she could call our office to clarify or she can call the [place] office that she was seen at.

## 2022-09-27 DIAGNOSIS — Z86.19 HISTORY OF HERPES GENITALIS: ICD-10-CM

## 2022-09-28 RX ORDER — VALACYCLOVIR HYDROCHLORIDE 500 MG/1
TABLET, FILM COATED ORAL
Qty: 30 TABLET | Refills: 3 | Status: SHIPPED | OUTPATIENT
Start: 2022-09-28

## 2023-03-17 DIAGNOSIS — Z86.19 HISTORY OF HERPES GENITALIS: ICD-10-CM

## 2023-03-17 RX ORDER — VALACYCLOVIR HYDROCHLORIDE 500 MG/1
TABLET, FILM COATED ORAL
Qty: 30 TABLET | Refills: 0 | Status: SHIPPED | OUTPATIENT
Start: 2023-03-17 | End: 2023-05-03

## 2023-03-27 ENCOUNTER — HOSPITAL ENCOUNTER (OUTPATIENT)
Age: 43
Setting detail: SPECIMEN
Discharge: HOME | End: 2023-03-27

## 2023-03-27 ENCOUNTER — OFFICE VISIT (OUTPATIENT)
Dept: OBGYN CLINIC | Age: 43
End: 2023-03-27
Payer: COMMERCIAL

## 2023-03-27 VITALS
BODY MASS INDEX: 25.9 KG/M2 | WEIGHT: 165 LBS | SYSTOLIC BLOOD PRESSURE: 122 MMHG | DIASTOLIC BLOOD PRESSURE: 74 MMHG | HEIGHT: 67 IN

## 2023-03-27 DIAGNOSIS — N92.1 MENORRHAGIA WITH IRREGULAR CYCLE: ICD-10-CM

## 2023-03-27 DIAGNOSIS — N94.6 DYSMENORRHEA: ICD-10-CM

## 2023-03-27 DIAGNOSIS — Z01.419 ENCOUNTER FOR WELL WOMAN EXAM WITH ROUTINE GYNECOLOGICAL EXAM: Primary | ICD-10-CM

## 2023-03-27 DIAGNOSIS — Z12.31 ENCOUNTER FOR SCREENING MAMMOGRAM FOR BREAST CANCER: ICD-10-CM

## 2023-03-27 PROCEDURE — 99396 PREV VISIT EST AGE 40-64: CPT | Performed by: ADVANCED PRACTICE MIDWIFE

## 2023-03-27 PROCEDURE — G8484 FLU IMMUNIZE NO ADMIN: HCPCS | Performed by: ADVANCED PRACTICE MIDWIFE

## 2023-03-27 RX ORDER — DROSPIRENONE AND ETHINYL ESTRADIOL 0.03MG-3MG
1 KIT ORAL DAILY
Qty: 1 PACKET | Refills: 12 | Status: SHIPPED | OUTPATIENT
Start: 2023-03-27

## 2023-03-27 ASSESSMENT — ANXIETY QUESTIONNAIRES
IF YOU CHECKED OFF ANY PROBLEMS ON THIS QUESTIONNAIRE, HOW DIFFICULT HAVE THESE PROBLEMS MADE IT FOR YOU TO DO YOUR WORK, TAKE CARE OF THINGS AT HOME, OR GET ALONG WITH OTHER PEOPLE: NOT DIFFICULT AT ALL
4. TROUBLE RELAXING: 0
2. NOT BEING ABLE TO STOP OR CONTROL WORRYING: 0
1. FEELING NERVOUS, ANXIOUS, OR ON EDGE: 0
GAD7 TOTAL SCORE: 0
6. BECOMING EASILY ANNOYED OR IRRITABLE: 0
3. WORRYING TOO MUCH ABOUT DIFFERENT THINGS: 0
5. BEING SO RESTLESS THAT IT IS HARD TO SIT STILL: 0
7. FEELING AFRAID AS IF SOMETHING AWFUL MIGHT HAPPEN: 0

## 2023-03-27 ASSESSMENT — PATIENT HEALTH QUESTIONNAIRE - PHQ9
1. LITTLE INTEREST OR PLEASURE IN DOING THINGS: 0
SUM OF ALL RESPONSES TO PHQ9 QUESTIONS 1 & 2: 0
SUM OF ALL RESPONSES TO PHQ QUESTIONS 1-9: 0
2. FEELING DOWN, DEPRESSED OR HOPELESS: 0

## 2023-03-27 NOTE — PROGRESS NOTES
Pt is here for annual exam  Last Mamm n/a  Last Pap 4/15/21 No IEL HPV +
follow-ups on file. Problem list reviewed and updated as indicated. Upon completion of the visit all questions were answered. History was reviewed as documented on Epic Navigator. The patient, Patsy Carballo, was seen with a total time spent of 30 minutes for the visit on this date of service by the [facility]  The time component involved both face-to-face (counseling and education) and non face-to-face time (care coordination), spent in determining the total time component.       Electronically Signed by: ARNULFO Stoll CNM

## 2023-03-29 LAB
HPV SAMPLE: ABNORMAL
HPV, GENOTYPE 16: NOT DETECTED
HPV, GENOTYPE 18: NOT DETECTED
HPV, HIGH RISK OTHER: DETECTED
HPV, INTERPRETATION: ABNORMAL
SPECIMEN DESCRIPTION: ABNORMAL

## 2023-04-03 LAB — CYTOLOGY REPORT: NORMAL

## 2023-04-26 PROBLEM — Z01.419 ENCOUNTER FOR WELL WOMAN EXAM WITH ROUTINE GYNECOLOGICAL EXAM: Status: RESOLVED | Noted: 2023-03-27 | Resolved: 2023-04-26

## 2023-05-03 DIAGNOSIS — Z86.19 HISTORY OF HERPES GENITALIS: ICD-10-CM

## 2023-05-03 RX ORDER — VALACYCLOVIR HYDROCHLORIDE 500 MG/1
TABLET, FILM COATED ORAL
Qty: 30 TABLET | Refills: 0 | Status: SHIPPED | OUTPATIENT
Start: 2023-05-03

## 2023-05-03 RX ORDER — VALACYCLOVIR HYDROCHLORIDE 500 MG/1
TABLET, FILM COATED ORAL
Qty: 30 TABLET | Refills: 0 | OUTPATIENT
Start: 2023-05-03

## 2023-06-29 DIAGNOSIS — Z86.19 HISTORY OF HERPES GENITALIS: ICD-10-CM

## 2023-06-29 RX ORDER — VALACYCLOVIR HYDROCHLORIDE 500 MG/1
TABLET, FILM COATED ORAL
Qty: 30 TABLET | Refills: 0 | OUTPATIENT
Start: 2023-06-29

## 2023-07-03 DIAGNOSIS — Z86.19 HISTORY OF HERPES GENITALIS: ICD-10-CM

## 2023-07-03 RX ORDER — VALACYCLOVIR HYDROCHLORIDE 500 MG/1
TABLET, FILM COATED ORAL
Qty: 30 TABLET | Refills: 0 | OUTPATIENT
Start: 2023-07-03

## 2023-07-05 ENCOUNTER — TELEPHONE (OUTPATIENT)
Dept: OBGYN CLINIC | Age: 43
End: 2023-07-05

## 2023-07-05 RX ORDER — VALACYCLOVIR HYDROCHLORIDE 500 MG/1
500 TABLET, FILM COATED ORAL 3 TIMES DAILY
Qty: 21 TABLET | Refills: 4 | Status: SHIPPED | OUTPATIENT
Start: 2023-07-05

## 2023-07-05 NOTE — TELEPHONE ENCOUNTER
Elsy Oshea is requesting a refill on valtrex.    Pt currently having an outbreak   Last Annual visit:  3/27/23  Next Office visit:  n/a      Last prescribing provider:  Cynid Voss

## 2023-07-10 RX ORDER — VALACYCLOVIR HYDROCHLORIDE 500 MG/1
TABLET, FILM COATED ORAL
Qty: 30 TABLET | Refills: 0 | OUTPATIENT
Start: 2023-07-10

## 2023-07-19 DIAGNOSIS — N94.6 DYSMENORRHEA: ICD-10-CM

## 2023-07-19 RX ORDER — LEVONORGESTREL AND ETHINYL ESTRADIOL 0.1-0.02MG
1 KIT ORAL DAILY
Qty: 1 PACKET | Refills: 11 | OUTPATIENT
Start: 2023-07-19 | End: 2023-08-09

## 2023-08-08 ENCOUNTER — HOSPITAL ENCOUNTER (OUTPATIENT)
Age: 43
Setting detail: SPECIMEN
Discharge: HOME | End: 2023-08-08

## 2023-08-08 ENCOUNTER — INITIAL CONSULT (OUTPATIENT)
Dept: OBGYN CLINIC | Age: 43
End: 2023-08-08

## 2023-08-08 VITALS — SYSTOLIC BLOOD PRESSURE: 144 MMHG | WEIGHT: 163 LBS | DIASTOLIC BLOOD PRESSURE: 80 MMHG | BODY MASS INDEX: 25.53 KG/M2

## 2023-08-08 DIAGNOSIS — B97.7 HPV IN FEMALE: Primary | ICD-10-CM

## 2023-08-08 DIAGNOSIS — N93.9 ABNORMAL UTERINE BLEEDING (AUB): ICD-10-CM

## 2023-08-08 NOTE — PROGRESS NOTES
[facility] [facility]  [address] [facility], [address] [facility],[address]      Procedure Note    Juan Manuel Carrion  2023                       Primary Care Physician: Pcp Britany      Subjective:   Juan Manuel Carrion 37 y.o. female  is here for previously scheduled Colposcopy due to history of Neg pap with + Other HRHPV x 2 years. No LMP recorded. (Menstrual status: Irregular periods). . She has no complaints today. Vitals:   Vitals:    23 1517 23 1605   BP: (!) 145/78 (!) 144/80   Site: Left Upper Arm Left Upper Arm   Position: Sitting Sitting   Cuff Size: Large Adult Large Adult   Weight: 163 lb (73.9 kg)          Procedure: Colposcopy, biopsies and ECC     Indications: Neg pap + Other HRHPV x 2 years    Procedure Details: The patient was counseled on the procedure. Risks, benefits and alternatives were reviewed. The patient is aware that this is diagnostic and not curative and a second procedure may be needed. A consent was reviewed and obtained. Colposcopy w/ Biopsies and Endocervical Curettage  A sterile speculum was placed into the vagina and the cervix was identified. The colposcope was positioned and the cervix was examined revealing no visible lesions. Green light was used to evaluate the vessels, revealing  no visible lesions. Acetoacetic acid was applied to the cervix, revealing acetowhite lesion(s) noted at 2 and 7 o'clock. Lugol's Iodine was applied to the cervix revealing  no visible lesions. The exam was considered to be satisfactory with the transformation zone visualized. Biopsies were taken at 2 and 7 O'clock. Silver nitrate sticks were used for hemostasis. Good hemostasis was observed. Biopsy tissue was sent to pathology. Endocervical curettage was then performed and tissue collected was sent to pathology. Impression: Satisfactory colposcopy acetowhite lesion(s) noted at 2 and 7 o'clock    The patient tolerated the procedure well.

## 2023-08-11 LAB — SURGICAL PATHOLOGY REPORT: NORMAL

## 2023-08-18 ENCOUNTER — HOSPITAL ENCOUNTER (OUTPATIENT)
Age: 43
Setting detail: SPECIMEN
Discharge: HOME | End: 2023-08-18

## 2023-08-18 ENCOUNTER — TELEPHONE (OUTPATIENT)
Dept: OBGYN CLINIC | Age: 43
End: 2023-08-18

## 2023-08-18 ENCOUNTER — OFFICE VISIT (OUTPATIENT)
Dept: OBGYN CLINIC | Age: 43
End: 2023-08-18

## 2023-08-18 VITALS
WEIGHT: 165 LBS | HEIGHT: 67 IN | SYSTOLIC BLOOD PRESSURE: 137 MMHG | DIASTOLIC BLOOD PRESSURE: 78 MMHG | BODY MASS INDEX: 25.9 KG/M2 | HEART RATE: 77 BPM

## 2023-08-18 DIAGNOSIS — N93.9 ABNORMAL UTERINE BLEEDING (AUB): ICD-10-CM

## 2023-08-18 DIAGNOSIS — Z01.818 PREOPERATIVE EXAM FOR GYNECOLOGIC SURGERY: Primary | ICD-10-CM

## 2023-08-18 NOTE — TELEPHONE ENCOUNTER
Surgery scheduled. Patient notified of date and time. Date: 9/27/2023  Time: 8:30am    PAT If needed:none        Ivm to arrive 2 hours prior to scheduled start time. No eating or drinking 8 hours prior.      Pt needs to be scheduled for a 2 week post-op     Augusta Aviles

## 2023-08-18 NOTE — PROGRESS NOTES
[address] [facility]  [address] [facility], [address] [place] [address],[address]      Pre-operative Orders    Amanda Rios  1980  2023  Primary Care Physician: Pcp No    HISTORY & PHYSICAL      2023       [facility]PITAL: [facility]    HPI: Amanda Rios is a 37 y.o. female . Patient has a history of abnormal uterine bleeding. Patient has tried medical management which has failed to regulate her cycles. She is desiring surgical management with an endometrial ablation at this time. Patient does have a history of a tubal ligation with her last  section and her family status is complete. 1. Preoperative exam for gynecologic surgery    2.  Abnormal uterine bleeding (AUB)       Patient Active Problem List   Diagnosis    History of herpes genitalis    NILM +HPV        OB History    Para Term  AB Living   2 0 0 0 0 2   SAB IAB Ectopic Molar Multiple Live Births   0 0 0 0 0 0      # Outcome Date GA Lbr Mirza/2nd Weight Sex Delivery Anes PTL Lv   2       CS-LTranv      1       CS-LTranv        Past Medical History:   Diagnosis Date    Abnormal Pap smear of cervix     Diabetes mellitus ([address])     Rheumatoid arthritis ([address])     Thyroid disease        Past Surgical History:   Procedure Laterality Date    ANKLE SURGERY Left     CARPAL TUNNEL RELEASE Left      SECTION      x2    COLPOSCOPY      FINGER SURGERY Left     KNEE SURGERY      x2    SHOULDER SURGERY Left     TUBAL LIGATION Bilateral 2004    With  Section       Social History     Socioeconomic History    Marital status: Single     Spouse name: Not on file    Number of children: Not on file    Years of education: Not on file    Highest education level: Not on file   Occupational History    Not on file   Tobacco Use    Smoking status: Never    Smokeless tobacco: Never   Vaping Use    Vaping Use: Never used   Substance and Sexual Activity    Alcohol use: No    Drug

## 2023-08-22 LAB — SURGICAL PATHOLOGY REPORT: NORMAL

## 2023-09-25 ENCOUNTER — TELEPHONE (OUTPATIENT)
Dept: OBGYN CLINIC | Age: 43
End: 2023-09-25

## 2023-09-25 NOTE — TELEPHONE ENCOUNTER
Can you call patient to see if she has seen these doctors? Her surgery is Wednesday.     Thanks,    Nadia Chahal, [address] [facility]   9/25/2023, 12:03 PM

## 2023-09-25 NOTE — TELEPHONE ENCOUNTER
Kenny Noel calling from pre-op processing needing clearance from PCP and endocrinologist for surgery on 09/27/23.   [phone]

## 2023-09-26 ENCOUNTER — TELEPHONE (OUTPATIENT)
Dept: OBGYN CLINIC | Age: 43
End: 2023-09-26

## 2023-10-17 DIAGNOSIS — Z86.19 HISTORY OF HERPES GENITALIS: ICD-10-CM

## 2023-10-18 RX ORDER — VALACYCLOVIR HYDROCHLORIDE 1 G/1
1000 TABLET, FILM COATED ORAL DAILY
Qty: 5 TABLET | Refills: 1 | Status: SHIPPED | OUTPATIENT
Start: 2023-10-18 | End: 2023-10-23

## 2023-10-18 RX ORDER — VALACYCLOVIR HYDROCHLORIDE 500 MG/1
TABLET, FILM COATED ORAL
Qty: 30 TABLET | Refills: 0 | Status: SHIPPED | OUTPATIENT
Start: 2023-10-18 | End: 2023-10-18

## 2024-04-12 ENCOUNTER — TELEPHONE (OUTPATIENT)
Dept: OBGYN CLINIC | Age: 44
End: 2024-04-12

## 2024-04-12 NOTE — TELEPHONE ENCOUNTER
Reached out to pt to inform her that to continue to receive rx refills she would need to be seen for annual visit. Pt VM full un able to LVM.

## 2024-06-07 RX ORDER — VALACYCLOVIR HYDROCHLORIDE 500 MG/1
500 TABLET, FILM COATED ORAL 3 TIMES DAILY
Qty: 21 TABLET | Refills: 4 | Status: SHIPPED | OUTPATIENT
Start: 2024-06-07

## 2024-08-15 ENCOUNTER — TELEPHONE (OUTPATIENT)
Dept: OBGYN CLINIC | Age: 44
End: 2024-08-15

## 2024-08-15 NOTE — TELEPHONE ENCOUNTER
LM on  asking for a call [phone]  to schedule a mammogram if no breast issues as a self referral.. If any questions she is to call us.

## 2024-09-27 RX ORDER — VALACYCLOVIR HYDROCHLORIDE 500 MG/1
500 TABLET, FILM COATED ORAL 3 TIMES DAILY
Qty: 21 TABLET | Refills: 1 | OUTPATIENT
Start: 2024-09-27

## 2024-09-27 NOTE — TELEPHONE ENCOUNTER
Waiting on [employer] message response to verify pharmacy        Tiarra Blevins is requesting a refill on 9/27/28.     Last Annual visit:  3/27/24  Next Office visit:  none schedule, [employer] message sent to pt to call office and schedule appt ASAP        Last prescribing provider:  Ally Summers

## 2024-10-04 RX ORDER — NORETHINDRONE ACETATE 5 MG
TABLET ORAL
Qty: 90 TABLET | Refills: 0 | Status: SHIPPED | OUTPATIENT
Start: 2024-10-04

## 2024-11-04 ENCOUNTER — HOSPITAL ENCOUNTER (OUTPATIENT)
Age: 44
Setting detail: SPECIMEN
Discharge: HOME | End: 2024-11-04

## 2024-11-04 ENCOUNTER — OFFICE VISIT (OUTPATIENT)
Dept: OBGYN CLINIC | Age: 44
End: 2024-11-04
Payer: COMMERCIAL

## 2024-11-04 VITALS
DIASTOLIC BLOOD PRESSURE: 81 MMHG | HEIGHT: 67 IN | SYSTOLIC BLOOD PRESSURE: 139 MMHG | WEIGHT: 174.2 LBS | BODY MASS INDEX: 27.34 KG/M2

## 2024-11-04 DIAGNOSIS — Z01.419 ENCOUNTER FOR GYNECOLOGICAL EXAMINATION: Primary | ICD-10-CM

## 2024-11-04 DIAGNOSIS — Z86.19 HISTORY OF HERPES SIMPLEX INFECTION: ICD-10-CM

## 2024-11-04 DIAGNOSIS — Z12.31 ENCOUNTER FOR SCREENING MAMMOGRAM FOR BREAST CANCER: ICD-10-CM

## 2024-11-04 DIAGNOSIS — N92.1 MENORRHAGIA WITH IRREGULAR CYCLE: ICD-10-CM

## 2024-11-04 DIAGNOSIS — N84.1 ENDOCERVICAL POLYP: ICD-10-CM

## 2024-11-04 PROCEDURE — G8484 FLU IMMUNIZE NO ADMIN: HCPCS | Performed by: NURSE PRACTITIONER

## 2024-11-04 PROCEDURE — 99396 PREV VISIT EST AGE 40-64: CPT | Performed by: NURSE PRACTITIONER

## 2024-11-04 RX ORDER — NORETHINDRONE 5 MG/1
TABLET ORAL
Qty: 90 TABLET | Refills: 3 | Status: SHIPPED | OUTPATIENT
Start: 2024-11-04

## 2024-11-04 RX ORDER — VALACYCLOVIR HYDROCHLORIDE 1 G/1
500 TABLET, FILM COATED ORAL 3 TIMES DAILY
Qty: 30 TABLET | Refills: 2 | Status: SHIPPED | OUTPATIENT
Start: 2024-11-04

## 2024-11-04 ASSESSMENT — ENCOUNTER SYMPTOMS
RESPIRATORY NEGATIVE: 1
GASTROINTESTINAL NEGATIVE: 1
ABDOMINAL DISTENTION: 0
BACK PAIN: 0
SHORTNESS OF BREATH: 0
COUGH: 0
ALLERGIC/IMMUNOLOGIC NEGATIVE: 1

## 2024-11-04 NOTE — PROGRESS NOTES
Chaperone for Intimate Exam  Chaperone was offered as part of the rooming process. Patient declined and agrees to continue with exam without a chaperone.  Chaperone: NONE       
Endocervical polyp       Had polyp removed in 3/2021- benign declines removal today will monitor              Hereditary Breast, Ovarian, Colon and Uterine Cancer screening Done.          Tobacco & Secondary smoke risks reviewed; instructed on cessation and avoidance    - Pap collected per ASCCP guidelines.  -Discussed menopausal symptoms, HRT, incontinence.  - Screening mammogram discussed and advised yearly if normalstarting at age 40.  - Calcium and Vitamin D dosing reviewed.  - Colonoscopy screening reviewed.   - General diet and exercise reviewed.   - Routine health maintenance per patients PCP.    Return in about 1 year (around 11/4/2025) for annual exam.        Electronically signed by ARNULFO Acevedo CNP on 11/1/24 at 12:27 PM EDT

## 2024-11-05 LAB
HPV I/H RISK 4 DNA CVX QL NAA+PROBE: NOT DETECTED
HPV SAMPLE: NORMAL
HPV, INTERPRETATION: NORMAL
HPV16 DNA CVX QL NAA+PROBE: NOT DETECTED
HPV18 DNA CVX QL NAA+PROBE: NOT DETECTED
SPECIMEN DESCRIPTION: NORMAL

## 2024-11-12 LAB — CYTOLOGY REPORT: NORMAL

## 2025-03-28 DIAGNOSIS — N92.1 MENORRHAGIA WITH IRREGULAR CYCLE: ICD-10-CM

## 2025-03-31 RX ORDER — NORETHINDRONE 5 MG/1
TABLET ORAL
Qty: 90 TABLET | Refills: 2 | Status: SHIPPED | OUTPATIENT
Start: 2025-03-31

## 2025-04-10 DIAGNOSIS — Z86.19 HISTORY OF HERPES SIMPLEX INFECTION: ICD-10-CM

## 2025-04-10 RX ORDER — VALACYCLOVIR HYDROCHLORIDE 1 G/1
TABLET, FILM COATED ORAL
Qty: 30 TABLET | Refills: 2 | Status: SHIPPED | OUTPATIENT
Start: 2025-04-10